# Patient Record
Sex: MALE | Race: ASIAN | NOT HISPANIC OR LATINO
[De-identification: names, ages, dates, MRNs, and addresses within clinical notes are randomized per-mention and may not be internally consistent; named-entity substitution may affect disease eponyms.]

---

## 2019-10-01 PROBLEM — Z00.00 ENCOUNTER FOR PREVENTIVE HEALTH EXAMINATION: Status: ACTIVE | Noted: 2019-10-01

## 2019-10-02 ENCOUNTER — APPOINTMENT (OUTPATIENT)
Dept: ORTHOPEDIC SURGERY | Facility: CLINIC | Age: 34
End: 2019-10-02
Payer: COMMERCIAL

## 2019-10-02 DIAGNOSIS — M75.50 BURSITIS OF UNSPECIFIED SHOULDER: ICD-10-CM

## 2019-10-02 PROCEDURE — 99203 OFFICE O/P NEW LOW 30 MIN: CPT

## 2019-10-02 PROCEDURE — 73030 X-RAY EXAM OF SHOULDER: CPT | Mod: RT

## 2019-10-02 NOTE — ASSESSMENT
[FreeTextEntry1] : Discussed at length with patient exam symptoms and imaging. Offered cortisone injection the patient declined. If no improvement 3-4 weeks consideration to injection\par

## 2019-10-02 NOTE — HISTORY OF PRESENT ILLNESS
[de-identified] : Patient reports that the RIGHT shoulder began to bother him about 2 months ago, atraumatic. Limited ROM. Pain with trying to move arm past certain point. Pain with rottating arm behind as well as lifting arm above the head. Pain with changing clothes. Tightness.

## 2019-10-02 NOTE — PHYSICAL EXAM
[de-identified] : Right Shoulder:\par Constitutional:\par The patient is healthy-appearing and in no apparent distress. \par \par Cardiovascular System: \par The capillary refill is less than 2 seconds. \par \par Skin: \par There are no skin abnormalities.\par \par C-Spine/Neck:\par \par Active Range of Motion:\par Flexion				50\par Extension			60\par Lateral rotation			80  \par \par Right Shoulder: \par Inspection: \par There is no atrophy, erythema, warmth, swelling.\par There is no scapular winging.\par There is no AC prominence. \par \par Bony Palpation: \par There is no tenderness of the clavicle.\par There is no tenderness of the acromioclavicular joint.\par There is no tenderness of the greater tuberosity. \par There is no tenderness of the bicipital groove.\par  \par Soft Tissue Palpation: \par There is no tenderness of the trapezius.\par There is no tenderness of the rhomboid.\par There is no tenderness of the subacromial bursa. \par \par Active Range of Motion: \par Forward flexion- 				180 \par Abduction-					150\par External rotation at 0 degrees abduction-	80 \par Internal rotation at 0 degrees abduction-	80\par \par Passive Range of Motion: \par Forward flexion- 			180 \par Abduction-				150\par External rotation at 0 deg abduction-	80 \par Internal rotation at 0 deg abduction-	80\par \par Strength:\par Supraspinatus / Abduction                  5/5\par External rotation                                 5/5\par Internal roation                                   5/5\par \par Special Tests: \par Hawkin's  				Positive \par Neer's  				Positive \par Speed's  				Negative\par AC cross-over 			            Negative\par [place]'s  				Negative\par \par Stability: \par There is no general laxity. \par There is no anterior apprehension.\par \par Strength: \par Supraspinatus abduction 		5/5\par External rotation at 0 deg abduction 	5/5\par Internal rotation at 0 deg abduction	5/5\par Scapular elevation 			5/5 \par \par Neurological System: \par \par There is normal sensation to light touch C5-T1. \par \par Psychiatric: \par The patient demonstrates a normal mood and affect and is active and alert.\par \par  [de-identified] : X-ray right shoulder. There is no significant bony abnormality arthritis or fracture

## 2024-02-02 ENCOUNTER — TRANSCRIPTION ENCOUNTER (OUTPATIENT)
Age: 39
End: 2024-02-02

## 2024-02-03 ENCOUNTER — EMERGENCY (EMERGENCY)
Facility: HOSPITAL | Age: 39
LOS: 1 days | Discharge: HOME | End: 2024-02-03
Attending: EMERGENCY MEDICINE | Admitting: EMERGENCY MEDICINE
Payer: COMMERCIAL

## 2024-02-03 VITALS
RESPIRATION RATE: 15 BRPM | HEART RATE: 68 BPM | OXYGEN SATURATION: 99 % | DIASTOLIC BLOOD PRESSURE: 66 MMHG | SYSTOLIC BLOOD PRESSURE: 109 MMHG | TEMPERATURE: 99 F

## 2024-02-03 DIAGNOSIS — L02.212 CUTANEOUS ABSCESS OF BACK [ANY PART, EXCEPT BUTTOCK]: ICD-10-CM

## 2024-02-03 DIAGNOSIS — L72.3 SEBACEOUS CYST: ICD-10-CM

## 2024-02-03 DIAGNOSIS — Z88.1 ALLERGY STATUS TO OTHER ANTIBIOTIC AGENTS STATUS: ICD-10-CM

## 2024-02-03 PROCEDURE — 99284 EMERGENCY DEPT VISIT MOD MDM: CPT

## 2024-02-03 RX ORDER — SACCHAROMYCES BOULARDII 250 MG
1 POWDER IN PACKET (EA) ORAL
Qty: 10 | Refills: 0
Start: 2024-02-03 | End: 2024-02-09

## 2024-02-03 RX ADMIN — Medication 300 MILLIGRAM(S): at 13:25

## 2024-02-03 NOTE — ED PROVIDER NOTE - PATIENT PORTAL LINK FT
You can access the FollowMyHealth Patient Portal offered by [facility] by registering at the following website: [email or web address]. By joining [facility]’s FollowMyHealth portal, you will also be able to view your health information using other applications (apps) compatible with our system.

## 2024-02-03 NOTE — ED PROVIDER NOTE - CARE PROVIDER_API CALL
Osmany Veronica  Plastic Surgery  [address] [place] [id number]  Phone: [phone]  Fax: [phone]  Follow Up Time:

## 2024-02-03 NOTE — ED PROVIDER NOTE - NSFOLLOWUPINSTRUCTIONS_ED_ALL_ED_FT
Please follow up with Dr Veronica in the office.  Please take the additional antibiotic as prescribed.  Return at any time if you have any concerns.

## 2024-02-03 NOTE — ED ADULT TRIAGE NOTE - CHIEF COMPLAINT QUOTE
patient walk in with large cyst on left lower back; been inflamed since Saturday; went to [facility] and was started on Bactrim abx since Tuesday

## 2024-02-03 NOTE — ED PROVIDER NOTE - CLINICAL SUMMARY MEDICAL DECISION MAKING FREE TEXT BOX
cc:  abscess, infected cyst lower back  HPI:  cyst getting redder and larger for four days, on Bactrim for four days    1:11pm Dr Veronica plastic surgery present for incision and drainage, will follow

## 2024-02-03 NOTE — ED ADULT NURSE NOTE - NSFALLUNIVINTERV_ED_ALL_ED
Bed/Stretcher in lowest position, wheels locked, appropriate side rails in place/Call bell, personal items and telephone in reach/Instruct patient to call for assistance before getting out of bed/chair/stretcher/Non-slip footwear applied when patient is off stretcher/[place] to call system/Physically safe environment - no spills, clutter or unnecessary equipment/Purposeful proactive rounding/Room/bathroom lighting operational, light cord in reach

## 2024-02-05 LAB
CULTURE RESULTS: SIGNIFICANT CHANGE UP
SPECIMEN SOURCE: SIGNIFICANT CHANGE UP

## 2024-03-08 ENCOUNTER — TRANSCRIPTION ENCOUNTER (OUTPATIENT)
Age: 39
End: 2024-03-08
